# Patient Record
Sex: FEMALE | Race: WHITE | URBAN - METROPOLITAN AREA
[De-identification: names, ages, dates, MRNs, and addresses within clinical notes are randomized per-mention and may not be internally consistent; named-entity substitution may affect disease eponyms.]

---

## 2017-10-16 ENCOUNTER — OFFICE VISIT (OUTPATIENT)
Dept: FAMILY MEDICINE | Facility: CLINIC | Age: 42
End: 2017-10-16

## 2017-10-16 VITALS
TEMPERATURE: 97.9 F | DIASTOLIC BLOOD PRESSURE: 79 MMHG | RESPIRATION RATE: 18 BRPM | BODY MASS INDEX: 28.07 KG/M2 | SYSTOLIC BLOOD PRESSURE: 124 MMHG | OXYGEN SATURATION: 96 % | HEART RATE: 81 BPM | WEIGHT: 143 LBS | HEIGHT: 60 IN

## 2017-10-16 DIAGNOSIS — Z00.00 ROUTINE GENERAL MEDICAL EXAMINATION AT A HEALTH CARE FACILITY: ICD-10-CM

## 2017-10-16 DIAGNOSIS — Z00.00 ROUTINE HEALTH MAINTENANCE: Primary | ICD-10-CM

## 2017-10-16 LAB
HCT VFR BLD AUTO: 42.3 % (ref 35–47)
HEMOGLOBIN: 13.3 G/DL (ref 11.7–15.7)
MCH RBC QN AUTO: 30.9 PG (ref 26.5–35)
MCHC RBC AUTO-ENTMCNC: 31.4 G/DL (ref 32–36)
MCV RBC AUTO: 98.2 FL (ref 78–100)
PLATELET # BLD AUTO: 317 K/UL (ref 150–450)
RBC # BLD AUTO: 4.31 M/UL (ref 3.8–5.2)
WBC # BLD AUTO: 13.5 K/UL (ref 4–11)

## 2017-10-16 RX ORDER — ACETAMINOPHEN 325 MG/1
650 TABLET ORAL EVERY 6 HOURS PRN
Qty: 100 TABLET | Refills: 0 | Status: SHIPPED | OUTPATIENT
Start: 2017-10-16

## 2017-10-16 NOTE — MR AVS SNAPSHOT
After Visit Summary   10/16/2017    Catarina Guerra    MRN: [id number]           Patient Information     Date Of Birth          1975        Visit Information        Provider Department      10/16/2017 3:40 PM Christiana Mathis DO [facility]        Today's Diagnoses     Routine health maintenance    -  1      Care Instructions    Here is the plan from today's visit    1. Routine health maintenance  - M Tuberculosis by Quantiferon  - HIV Antigen Antibody Combo  - Hepatitis A Antibody IgG  - Hepatitis B Surface Antibody  - Hepatitis B core antibody  - Hepatitis B surface antigen  - Hepatitis C antibody  - TSH with free T4 reflex  - Basic Metabolic Panel ([facility])  - CBC with Plt ([facility])  - Anti Treponema [JYC1538]  - Herpes Simplex Virus 1 and 2 IgG [[id number]]  - acetaminophen (TYLENOL) 325 MG tablet; Take 2 tablets (650 mg) by mouth every 6 hours as needed for mild pain  Dispense: 100 tablet; Refill: 0      Please call or return to clinic if your symptoms don't go away.    Please make a clinic appointment for follow up with me (CHRISTIANA MATHIS) in 1 or 2 weeks for Pap Smear and test results.    Thank you for coming to [facility] today.    Christiana Mathis DO     [facility]  [phone]  [facility]    Lab Testing:  **If you had lab testing today and your results are reassuring or normal they will be mailed to you or sent through [employer] within 7 days.   **If the lab tests need quick action we will call you with the results.  The phone number we will call with results is # [phone] (home) . If this is not the best number please call our clinic and change the number.  Medication Refills:  If you need any refills please call your pharmacy and they will contact us.   If you need to  your refill at a new pharmacy, please contact the new pharmacy directly. The new pharmacy will help you get your medications  transferred faster.   Scheduling:  If you have any concerns about today's visit or wish to schedule another appointment please call our office during normal business hours [phone] (8-5:00 M-F)  If a referral was made to a [facility] and you don't get a call from central scheduling please call [phone].  If a Mammogram was ordered for you at [facility] call [phone] to schedule or change your appointment.  If you had an XRay/CT/Ultrasound/MRI ordered the number is [phone] to schedule or change your radiology appointment.   Medical Concerns:  If you have urgent medical concerns please call [phone] at any time of the day.            Follow-ups after your visit        Who to contact     Please call your clinic at [phone] to:    Ask questions about your health    Make or cancel appointments    Discuss your medicines    Learn about your test results    Speak to your doctor   If you have compliments or concerns about an experience at your clinic, or if you wish to file a complaint, please contact [facility] Patient Relations at [phone] or email us at [email or web address]         Additional Information About Your Visit        [employer] Information     [employer] gives you secure access to your electronic health record. If you see a primary care provider, you can also send messages to your care team and make appointments. If you have questions, please call your primary care clinic.  If you do not have a primary care provider, please call [phone] and they will assist you.      Neotract is an electronic gateway that provides easy, online access to your medical records. With [employer], you can request a clinic appointment, read your test results, renew a prescription or communicate with your care team.     To access your existing account, please contact your [facility] or call [phone] for  "assistance.        Care EveryWhere ID     This is your Care EveryWhere ID. This could be used by other organizations to access your [place] medical records  [id number]        Your Vitals Were     Pulse Temperature Respirations Height Pulse Oximetry BMI (Body Mass Index)    81 97.9  F (36.6  C) (Oral) 18 4' 11.65\" (151.5 cm) 96% 28.26 kg/m2       Blood Pressure from Last 3 Encounters:   10/16/17 124/79   10/27/15 108/64    Weight from Last 3 Encounters:   10/16/17 143 lb (64.9 kg)   10/27/15 127 lb (57.6 kg)              We Performed the Following     Anti Treponema [[id number]]     Basic Metabolic Panel ([facility])     CBC with Plt ([facility])     Hepatitis A Antibody IgG     Hepatitis B core antibody     Hepatitis B Surface Antibody     Hepatitis B surface antigen     Hepatitis C antibody     Herpes Simplex Virus 1 and 2 IgG [[id number]]     HIV Antigen Antibody Combo     M Tuberculosis by Quantiferon     TSH with free T4 reflex          Today's Medication Changes          These changes are accurate as of: 10/16/17  5:28 PM.  If you have any questions, ask your nurse or doctor.               Start taking these medicines.        Dose/Directions    acetaminophen 325 MG tablet   Commonly known as:  TYLENOL   Used for:  Routine health maintenance   Started by:  Christiana Mathis DO        Dose:  650 mg   Take 2 tablets (650 mg) by mouth every 6 hours as needed for mild pain   Quantity:  100 tablet   Refills:  0         Stop taking these medicines if you haven't already. Please contact your care team if you have questions.     amphetamine-dextroamphetamine 30 MG per 24 hr capsule   Commonly known as:  ADDERALL XR   Stopped by:  Christiana Mathis DO           SUBOXONE 8-2 MG per film   Generic drug:  buprenorphine HCl-naloxone HCl   Stopped by:  Christiana Mathis DO                Where to get your medicines      Some of these will need a paper prescription and others can be bought over the counter.  Ask your nurse if you have " questions.     Bring a paper prescription for each of these medications     acetaminophen 325 MG tablet                Primary Care Provider    Physician No Ref-Primary       NO REF-PRIMARY PHYSICIAN        Equal Access to Services     TAMAR PYLE : Hadii aad ku hadkailacaitlin Agustinaali, eliud cirojohanne, teetee mercedes, fredo zamorano mcyaa preston laJaydemaurilio atilio. So [facility] [phone].    ATENCIÓN: Si habla español, tiene a bhandari disposición servicios gratuitos de asistencia lingüística. Llame al [phone].    We comply with applicable federal civil rights laws and [place] laws. We do not discriminate on the basis of race, color, national origin, age, disability, sex, sexual orientation, or gender identity.            Thank you!     Thank you for choosing [facility]  for your care. Our goal is always to provide you with excellent care. Hearing back from our patients is one way we can continue to improve our services. Please take a few minutes to complete the written survey that you may receive in the mail after your visit with us. Thank you!             Your Updated Medication List - Protect others around you: Learn how to safely use, store and throw away your medicines at www.disposemymeds.org.          This list is accurate as of: 10/16/17  5:28 PM.  Always use your most recent med list.                   Brand Name Dispense Instructions for use Diagnosis    acetaminophen 325 MG tablet    TYLENOL    100 tablet    Take 2 tablets (650 mg) by mouth every 6 hours as needed for mild pain    Routine health maintenance       DULoxetine 60 MG EC capsule    CYMBALTA     Take by mouth daily

## 2017-10-16 NOTE — PATIENT INSTRUCTIONS
Here is the plan from today's visit    1. Routine health maintenance  - M Tuberculosis by Quantiferon  - HIV Antigen Antibody Combo  - Hepatitis A Antibody IgG  - Hepatitis B Surface Antibody  - Hepatitis B core antibody  - Hepatitis B surface antigen  - Hepatitis C antibody  - TSH with free T4 reflex  - Basic Metabolic Panel ([facility])  - CBC with Plt ([facility])  - Anti Treponema [[id number]]  - Herpes Simplex Virus 1 and 2 IgG [[id number]]  - acetaminophen (TYLENOL) 325 MG tablet; Take 2 tablets (650 mg) by mouth every 6 hours as needed for mild pain  Dispense: 100 tablet; Refill: 0      Please call or return to clinic if your symptoms don't go away.    Please make a clinic appointment for follow up with me (CHRISTIANA MATHIS) in 1 or 2 weeks for Pap Smear and test results.    Thank you for coming to [facility] today.    Christiana Mathis, DO     [facility]  [phone]  [facility]    Lab Testing:  **If you had lab testing today and your results are reassuring or normal they will be mailed to you or sent through [employer] within 7 days.   **If the lab tests need quick action we will call you with the results.  The phone number we will call with results is # [phone] (home) . If this is not the best number please call our clinic and change the number.  Medication Refills:  If you need any refills please call your pharmacy and they will contact us.   If you need to  your refill at a new pharmacy, please contact the new pharmacy directly. The new pharmacy will help you get your medications transferred faster.   Scheduling:  If you have any concerns about today's visit or wish to schedule another appointment please call our office during normal business hours [phone] (8-5:00 M-F)  If a referral was made to a [facility] and you don't get a call from central scheduling please call [phone].  If a Mammogram was ordered for you at  [facility] call [phone] to schedule or change your appointment.  If you had an XRay/CT/Ultrasound/MRI ordered the number is [phone] to schedule or change your radiology appointment.   Medical Concerns:  If you have urgent medical concerns please call [phone] at any time of the day.    Preventive Health Recommendations  Female Ages 40 to 49    Yearly exam:     See your health care provider every year in order to  1. Review health changes.   2. Discuss preventive care.    3. Review your medicines if your doctor prescribed any.      Get a Pap test every three years (unless you have an abnormal result and your provider advises testing more often).      If you get Pap tests with HPV test, you only need to test every 5 years, unless you have an abnormal result. You do not need a Pap test if your uterus was removed (hysterectomy) and you have not had cancer.      You should be tested each year for STDs (sexually transmitted diseases), if you're at risk.       Ask your doctor if you should have a mammogram.      Have a colonoscopy (test for colon cancer) if someone in your family has had colon cancer or polyps before age 50.       Have a cholesterol test every 5 years.       Have a diabetes test (fasting glucose) after age 45. If you are at risk for diabetes, you should have this test every 3 years.    Shots: Get a flu shot each year. Get a tetanus shot every 10 years.     Nutrition:     Eat at least 5 servings of fruits and vegetables each day.    Eat whole-grain bread, whole-wheat pasta and brown rice instead of white grains and rice.    Talk to your provider about Calcium and Vitamin D.     Lifestyle    Exercise at least 150 minutes a week (an average of 30 minutes a day, 5 days a week). This will help you control your weight and prevent disease.    Limit alcohol to one drink per day.    No smoking.     Wear sunscreen to prevent skin cancer.    See your dentist every six months for an exam and  cleaning.

## 2017-10-17 LAB
BUN SERPL-MCNC: 18.6 MG/DL (ref 7–19)
CALCIUM SERPL-MCNC: 9.6 MG/DL (ref 8.5–10.1)
CHLORIDE SERPLBLD-SCNC: 104.4 MMOL/L (ref 98–110)
CO2 SERPL-SCNC: 24.5 MMOL/L (ref 20–32)
CREAT SERPL-MCNC: 0.8 MG/DL (ref 0.5–1)
GFR SERPL CREATININE-BSD FRML MDRD: 82.8 ML/MIN/1.7 M2
GLUCOSE SERPL-MCNC: 105 MG'DL (ref 70–99)
HAV IGG SER QL IA: REACTIVE
HBV CORE AB SERPL QL IA: NONREACTIVE
HBV SURFACE AB SERPL IA-ACNC: 0.01 M[IU]/ML
HBV SURFACE AG SERPL QL IA: NONREACTIVE
HCV AB SERPL QL IA: NONREACTIVE
HIV 1+2 AB+HIV1 P24 AG SERPL QL IA: NONREACTIVE
POTASSIUM SERPL-SCNC: 3.9 MMOL/DL (ref 3.3–4.5)
SODIUM SERPL-SCNC: 134.8 MMOL/L (ref 132.6–141.4)
TSH SERPL DL<=0.005 MIU/L-ACNC: 1.01 MU/L (ref 0.4–4)

## 2017-10-18 LAB
HSV1 IGG SERPL QL IA: >8 AI (ref 0–0.8)
HSV2 IGG SERPL QL IA: <0.2 AI (ref 0–0.8)
M TB TUBERC IFN-G BLD QL: NEGATIVE
M TB TUBERC IFN-G/MITOGEN IGNF BLD: 0.02 IU/ML
T PALLIDUM IGG+IGM SER QL: NEGATIVE

## 2017-10-20 LAB — HAV IGM SERPL QL IA: NONREACTIVE

## 2017-10-24 ENCOUNTER — OFFICE VISIT (OUTPATIENT)
Dept: FAMILY MEDICINE | Facility: CLINIC | Age: 42
End: 2017-10-24

## 2017-10-24 VITALS
DIASTOLIC BLOOD PRESSURE: 75 MMHG | WEIGHT: 148 LBS | RESPIRATION RATE: 18 BRPM | BODY MASS INDEX: 29.25 KG/M2 | OXYGEN SATURATION: 96 % | HEART RATE: 89 BPM | TEMPERATURE: 98.2 F | SYSTOLIC BLOOD PRESSURE: 110 MMHG

## 2017-10-24 DIAGNOSIS — M25.50 ARTHRALGIA, UNSPECIFIED JOINT: ICD-10-CM

## 2017-10-24 DIAGNOSIS — R35.89 POLYURIA: ICD-10-CM

## 2017-10-24 DIAGNOSIS — K59.09 OTHER CONSTIPATION: Primary | ICD-10-CM

## 2017-10-24 DIAGNOSIS — Z12.4 PAP SMEAR FOR CERVICAL CANCER SCREENING: ICD-10-CM

## 2017-10-24 LAB
BILIRUBIN UR: NEGATIVE
BLOOD UR: NEGATIVE
GLUCOSE URINE: NEGATIVE
KETONES UR QL: NEGATIVE
LEUKOCYTE ESTERASE UR: NEGATIVE
NITRITE UR QL STRIP: NEGATIVE
PH UR STRIP: 7 [PH] (ref 5–7)
PROTEIN UR: NEGATIVE
SP GR UR STRIP: 1.01
UROBILINOGEN UR STRIP-ACNC: NORMAL

## 2017-10-24 RX ORDER — NAPROXEN 500 MG/1
500 TABLET ORAL 2 TIMES DAILY PRN
Qty: 60 TABLET | Refills: 1 | Status: SHIPPED | OUTPATIENT
Start: 2017-10-24 | End: 2017-10-24

## 2017-10-24 RX ORDER — POLYETHYLENE GLYCOL 3350 17 G/17G
1 POWDER, FOR SOLUTION ORAL DAILY
Qty: 510 G | Refills: 1 | Status: SHIPPED | OUTPATIENT
Start: 2017-10-24

## 2017-10-24 ASSESSMENT — ENCOUNTER SYMPTOMS
NAUSEA: 0
NERVOUS/ANXIOUS: 1
ABDOMINAL PAIN: 0
CONSTIPATION: 1
DIARRHEA: 0
HEMATURIA: 0
VOMITING: 0
COUGH: 0
APPETITE CHANGE: 1
FREQUENCY: 1
DYSURIA: 0
FEVER: 0
SHORTNESS OF BREATH: 0
CHILLS: 0

## 2017-10-24 NOTE — MR AVS SNAPSHOT
After Visit Summary   10/24/2017    Catarina Guerra    MRN: [id number]           Patient Information     Date Of Birth          1975        Visit Information        Provider Department      10/24/2017 1:20 PM Anny Mayen MD [facility]        Today's Diagnoses     Other constipation    -  1    Arthralgia, unspecified joint        Polyuria        Pap smear for cervical cancer screening          Care Instructions    Here is the plan from today's visit    1. Other constipation  Use Miralax daily or as needed to help with constipation.    - polyethylene glycol (MIRALAX) powder; Take 17 g (1 capful) by mouth daily  Dispense: 510 g; Refill: 1    2. Arthralgia, unspecified joint  Use Naproxen once or twice daily as needed for joint pain  - naproxen (NAPROSYN) 500 MG tablet; Take 1 tablet (500 mg) by mouth 2 times daily as needed for moderate pain  Dispense: 60 tablet; Refill: 1    3. Polyuria  We will check your urine for infection  - Urinalysis, Micro If (UA) ([facility])    4. Pap smear for cervical cancer screening  I performed a pap smear, although I am not completely sure if you have a cervix.    - Pap imaged thin layer screen with HPV - recommended age 30 - 65 years (select HPV order below)  - HPV High Risk Types DNA Cervical    Please call or return to clinic if your symptoms don't go away.    Follow up plan  Please make a clinic appointment for follow up with me (ANNY MAYEN) in 2-4 weeks for follow up.   Thank you for coming to [facility] today.  Lab Testing:  **If you had lab testing today and your results are reassuring or normal they will be mailed to you or sent through Getable within 7 days.   **If the lab tests need quick action we will call you with the results.  The phone number we will call with results is # [phone] (home) . If this is not the best number please call our clinic and change the number.  Medication Refills:  If you need any  refills please call your pharmacy and they will contact us.   If you need to  your refill at a new pharmacy, please contact the new pharmacy directly. The new pharmacy will help you get your medications transferred faster.   Scheduling:  If you have any concerns about today's visit or wish to schedule another appointment please call our office during normal business hours [phone] (8-5:00 M-F)  If a referral was made to a [facility] and you don't get a call from central scheduling please call [phone].  If a Mammogram was ordered for you at [facility] call [phone] to schedule or change your appointment.  If you had an XRay/CT/Ultrasound/MRI ordered the number is [phone] to schedule or change your radiology appointment.   Medical Concerns:  If you have urgent medical concerns please call [phone] at any time of the day.            Follow-ups after your visit        Who to contact     Please call your clinic at [phone] to:    Ask questions about your health    Make or cancel appointments    Discuss your medicines    Learn about your test results    Speak to your doctor   If you have compliments or concerns about an experience at your clinic, or if you wish to file a complaint, please contact [facility] Patient Relations at [phone] or email us at [email or web address]         Additional Information About Your Visit        MyChart Information     [facility] gives you secure access to your electronic health record. If you see a primary care provider, you can also send messages to your care team and make appointments. If you have questions, please call your primary care clinic.  If you do not have a primary care provider, please call [phone] and they will assist you.      [facility] is an electronic gateway that provides easy, online access to your medical records. With [facility], you can request a clinic appointment,  read your test results, renew a prescription or communicate with your care team.     To access your existing account, please contact your [facility] or call [phone] for assistance.        Care EveryWhere ID     This is your Care EveryWhere ID. This could be used by other organizations to access your [place] medical records  [id number]        Your Vitals Were     Pulse Temperature Respirations Pulse Oximetry BMI (Body Mass Index)       89 98.2  F (36.8  C) (Oral) 18 96% 29.25 kg/m2        Blood Pressure from Last 3 Encounters:   10/24/17 110/75   10/16/17 124/79   10/27/15 108/64    Weight from Last 3 Encounters:   10/24/17 148 lb (67.1 kg)   10/16/17 143 lb (64.9 kg)   10/27/15 127 lb (57.6 kg)              We Performed the Following     HPV High Risk Types DNA Cervical     Pap imaged thin layer screen with HPV - recommended age 30 - 65 years (select HPV order below)     Urinalysis, Micro If (UA) ([facility])          Today's Medication Changes          These changes are accurate as of: 10/24/17  2:01 PM.  If you have any questions, ask your nurse or doctor.               Start taking these medicines.        Dose/Directions    naproxen 500 MG tablet   Commonly known as:  NAPROSYN   Used for:  Arthralgia, unspecified joint   Started by:  Anny Bethea MD        Dose:  500 mg   Take 1 tablet (500 mg) by mouth 2 times daily as needed for moderate pain   Quantity:  60 tablet   Refills:  1       polyethylene glycol powder   Commonly known as:  MIRALAX   Used for:  Other constipation   Started by:  Anny Bethea MD        Dose:  1 capful   Take 17 g (1 capful) by mouth daily   Quantity:  510 g   Refills:  1            Where to get your medicines      These medications were [place] [facility] [place] [address]  [address], [place] [address]     Phone:  [phone]     naproxen 500 MG tablet    polyethylene glycol  powder                Primary Care Provider    Physician No Ref-Primary       NO REF-PRIMARY PHYSICIAN        Equal Access to Services     TAMAR PYLE : Hadii aad ku hadkailacaitlin Rorojessica, washirada fernandez, doroteoryan ortizvenufredo trivedi. So [facility] [phone].    ATENCIÓN: Si habla español, tiene a bhandari disposición servicios gratuitos de asistencia lingüística. Llame al [phone].    We comply with applicable federal civil rights laws and [place] laws. We do not discriminate on the basis of race, color, national origin, age, disability, sex, sexual orientation, or gender identity.            Thank you!     Thank you for choosing [facility]  for your care. Our goal is always to provide you with excellent care. Hearing back from our patients is one way we can continue to improve our services. Please take a few minutes to complete the written survey that you may receive in the mail after your visit with us. Thank you!             Your Updated Medication List - Protect others around you: Learn how to safely use, store and throw away your medicines at www.disposemymeds.org.          This list is accurate as of: 10/24/17  2:01 PM.  Always use your most recent med list.                   Brand Name Dispense Instructions for use Diagnosis    acetaminophen 325 MG tablet    TYLENOL    100 tablet    Take 2 tablets (650 mg) by mouth every 6 hours as needed for mild pain    Routine health maintenance       DULoxetine 60 MG EC capsule    CYMBALTA     Take by mouth daily        naproxen 500 MG tablet    NAPROSYN    60 tablet    Take 1 tablet (500 mg) by mouth 2 times daily as needed for moderate pain    Arthralgia, unspecified joint       polyethylene glycol powder    MIRALAX    510 g    Take 17 g (1 capful) by mouth daily    Other constipation

## 2017-10-24 NOTE — PROGRESS NOTES
Preceptor Attestation:   Patient seen and discussed with the resident. Assessment and plan reviewed with resident and agreed upon.   Supervising Physician:  Anny Hickman MD  [facility]

## 2017-10-24 NOTE — LETTER
November 2, 2017      Catarina Guerra  [address] [place] [id number]        Dear Catarina,    Thank you for getting your care at [facility]. Please see below for your test results.    Resulted Orders   Urinalysis, Micro If (UA) ([facility])   Result Value Ref Range    Specific Gravity Urine 1.015 1.005 - 1.030    pH Urine 7.0 4.5 - 8.0    Leukocyte Esterase UR Negative NEGATIVE    Nitrite Urine Negative NEGATIVE    Protein UR Negative NEGATIVE    Glucose Urine Negative NEGATIVE    Ketones Urine Negative NEGATIVE    Urobilinogen mg/dL 0.2 E.U./dL 0.2 E.U./dL    Bilirubin UR Negative NEGATIVE    Blood UR Negative NEGATIVE   Pap imaged thin layer screen with HPV - recommended age 30 - 65 years (select HPV order below)   Result Value Ref Range    PAP NIL     Copath Report         Acc#: [id number]   Signed: 10/26/2017 10:28   MR#: [id number]    SPECIMEN/STAIN PROCESS:  Pap imaged thin layer prep screening (Surepath, F[employer]Point with guided  screening)       Pap-Cyto x 1, HPV ordered x 1    SOURCE: Cervical, endocervical  ----------------------------------------------------------------   Pap imaged thin layer prep screening (Surepath, FocalPoint with guided  screening)  SPECIMEN ADEQUACY:  Satisfactory for evaluation.  -Transformation zone component absent.    CYTOLOGIC INTERPRETATION:    Negative for intraepithelial lesion or malignancy    Electronically signed by:  TERRY Mishra  (ASCP)    CLINICAL HISTORY:    Partial Hysterectomy,    Papanicolaou Test Limitations:  Cervical cytology is a screening test  with limited sensitivity; regular screening is critical for cancer  prevention; Pap tests are primarily effective for the  diagnosis/prevention of squamous cell carcinoma, not adenocarcinomas or  other cancers.  TESTING LAB LOCATION:  [facility], [address] [place] [id number], [phone]  Processed and screened at [facility]  [facility]     HPV High Risk Types DNA Cervical   Result Value Ref Range    HPV 16 DNA Negative NEG^Negative    HPV 18 DNA Negative NEG^Negative    Other HR HPV Negative NEG^Negative    Final Diagnosis This patient's sample is negative for HPV DNA.       Comment:      (Note)  METHODOLOGY:  The Roche mahad 4800 system uses automated extraction,   simultaneous amplification of HPV (L1 region) and beta-globin,    followed by  real time detection of fluorescent labeled HPV and beta   globin using specific oligonucleotide probes . The test specifically   identifies types HPV 16 DNA and HPV 18 DNA while concurrently   detecting the rest of the high risk types (31, 33, 35, 39, 45, 51,   52, 56, 58, 59, 66 or 68).  COMMENTS:  This test is not intended for use as a screening device   for women under age 30 with normal cervical cytology.  Results should   be correlated with cytologic and histologic findings. Close clinical   followup is recommended.  This test was developed and its performance characteristics   determined by the [facility], [facility]   [facility]. It has not been cleared or approved by the   FDA. The laboratory is regulated under CLIA as qualified to perform   high-complexity testing. This test is used   for clinical purposes. It   should not be regarded as investigational or for research.      Specimen Description Cervical Cells       Comment:      [id number]       If you have any concerns about these results please call and leave a message for me or send a MyChart message to the clinic.    Sincerely,    Anny Bethea MD

## 2017-10-24 NOTE — TELEPHONE ENCOUNTER
Request for medication refill:  Naproxen 500mg     Date of last visit at clinic: 10/24/2017    Please complete refill if appropriate and CLOSE ENCOUNTER.    Closing the encounter signifies the refill is complete.    If refill has been denied, please complete the smart phrase .smirefuse and route it to the [facility] RN TRIAGE pool to inform the patient and the pharmacy.    Elizabeth Merida, CMA

## 2017-10-24 NOTE — PATIENT INSTRUCTIONS
Here is the plan from today's visit    1. Other constipation  Use Miralax daily or as needed to help with constipation.    - polyethylene glycol (MIRALAX) powder; Take 17 g (1 capful) by mouth daily  Dispense: 510 g; Refill: 1    2. Arthralgia, unspecified joint  Use Naproxen once or twice daily as needed for joint pain  - naproxen (NAPROSYN) 500 MG tablet; Take 1 tablet (500 mg) by mouth 2 times daily as needed for moderate pain  Dispense: 60 tablet; Refill: 1    3. Polyuria  We will check your urine for infection  - Urinalysis, Micro If (UA) ([facility])    4. Pap smear for cervical cancer screening  I performed a pap smear, although I am not completely sure if you have a cervix.    - Pap imaged thin layer screen with HPV - recommended age 30 - 65 years (select HPV order below)  - HPV High Risk Types DNA Cervical    Please call or return to clinic if your symptoms don't go away.    Follow up plan  Please make a clinic appointment for follow up with me (SHERRY MAYEN) in 2-4 weeks for follow up.   Thank you for coming to [facility] today.  Lab Testing:  **If you had lab testing today and your results are reassuring or normal they will be mailed to you or sent through [employer] within 7 days.   **If the lab tests need quick action we will call you with the results.  The phone number we will call with results is # [phone] (home) . If this is not the best number please call our clinic and change the number.  Medication Refills:  If you need any refills please call your pharmacy and they will contact us.   If you need to  your refill at a new pharmacy, please contact the new pharmacy directly. The new pharmacy will help you get your medications transferred faster.   Scheduling:  If you have any concerns about today's visit or wish to schedule another appointment please call our office during normal business hours [phone] (8-5:00 M-F)  If a referral was made to a [facility] and you don't get a call from central scheduling please call [phone].  If a Mammogram was ordered for you at [facility] call [phone] to schedule or change your appointment.  If you had an XRay/CT/Ultrasound/MRI ordered the number is [phone] to schedule or change your radiology appointment.   Medical Concerns:  If you have urgent medical concerns please call [phone] at any time of the day.

## 2017-10-25 ENCOUNTER — TELEPHONE (OUTPATIENT)
Dept: FAMILY MEDICINE | Facility: CLINIC | Age: 42
End: 2017-10-25

## 2017-10-25 NOTE — TELEPHONE ENCOUNTER
[facility] phone call message- medication clarification/question:    Full Medication Name: Mirtazapine   Dose: 15 MG    Question: Rolanda called from [facility].  States patient has Mirtazapine medication.  Would like to know if this medication can be changed from AM to bedtime.  Please call [phone] or fax to [phone].    Pharmacy confirmed as [facility] [place] [id number]: Yes    OK to leave a message on voice mail? Yes    Primary language: English      needed? No    Call taken on October 25, 2017 at 1:11 PM by Rubina Cole

## 2017-10-26 LAB
COPATH REPORT: NORMAL
PAP: NORMAL

## 2017-10-26 NOTE — TELEPHONE ENCOUNTER
Returned call to Rolanda, but she was out of the office.  Regarding the Mirtazapine for this patient, it is not something I was aware of or that is on her medication list.  I would suggest that they call the original physician who prescribed the medication to have the dosage and/or time adjusted.    Thanks,   Anny Bethea M.D.  PGY-3, Family Medicine

## 2017-10-27 LAB
FINAL DIAGNOSIS: NORMAL
HPV HR 12 DNA CVX QL NAA+PROBE: NEGATIVE
HPV16 DNA SPEC QL NAA+PROBE: NEGATIVE
HPV18 DNA SPEC QL NAA+PROBE: NEGATIVE
SPECIMEN DESCRIPTION: NORMAL

## 2017-10-27 RX ORDER — NAPROXEN 500 MG/1
500 TABLET ORAL 2 TIMES DAILY PRN
Qty: 60 TABLET | Refills: 1 | Status: SHIPPED | OUTPATIENT
Start: 2017-10-27

## 2017-10-31 NOTE — TELEPHONE ENCOUNTER
RN called Rolanda. Rolanda stated she heard from Dr. Bethea. No further action requried    Mary Ellen Green RN

## 2017-11-09 ASSESSMENT — ENCOUNTER SYMPTOMS
SPEECH DIFFICULTY: 0
EYES NEGATIVE: 1
WEAKNESS: 0
SEIZURES: 0
MYALGIAS: 0
COUGH: 0
CONFUSION: 0
HEADACHES: 0
DIFFICULTY URINATING: 0
ACTIVITY CHANGE: 0
DYSURIA: 0
CONSTIPATION: 0
SHORTNESS OF BREATH: 0
PALPITATIONS: 0
WHEEZING: 0
VOMITING: 0
APPETITE CHANGE: 0
FREQUENCY: 1
ENDOCRINE NEGATIVE: 1
CHEST TIGHTNESS: 0
NAUSEA: 0
JOINT SWELLING: 0
NERVOUS/ANXIOUS: 1
FEVER: 0
FLANK PAIN: 0
SLEEP DISTURBANCE: 1
FACIAL ASYMMETRY: 0
AGITATION: 0
DIARRHEA: 0
ARTHRALGIAS: 0
HEMATURIA: 0
ABDOMINAL PAIN: 0
DYSPHORIC MOOD: 0
BACK PAIN: 1

## 2017-11-10 NOTE — PROGRESS NOTES
HPI:       Catarina Guerra is a 42 year old who presents for a general Physical. She also reports urinary urgency symptoms that have been present for the past year. She has a Hx of drug and alcohol addiction in remission, and she is currently a resident at [facility].       Concerns today:   Concern: Urinary Incontinence   Description of the problem : present for year+, but getting worse during prior 6 months that she has been having hot flashes.       Adherence and Exercise  Medication side effects: no  How often is a medication missed? Never, but recent medication changes are causing anxiety. Will not be getting Adderall for ADHD or Suboxone for addiction at current facility.  Exercise: No regular exercise     Patient Active Problem List   Diagnosis     Opioid type dependence (H)     Mood disorder (H)     Abscess        Family History     Age of Onset     CANCER       skin cancer     42 year old female for annual routine checkup.  Current Outpatient Prescriptions   Medication Sig Dispense Refill     acetaminophen (TYLENOL) 325 MG tablet Take 2 tablets (650 mg) by mouth every 6 hours as needed for mild pain 100 tablet 0     DULoxetine (CYMBALTA) 60 MG capsule Take by mouth daily       naproxen (NAPROSYN) 500 MG tablet Take 1 tablet (500 mg) by mouth 2 times daily as needed for moderate pain 60 tablet 1     polyethylene glycol (MIRALAX) powder Take 17 g (1 capful) by mouth daily 510 g 1     Allergies: Adhesive tape   No LMP recorded. Patient has had a hysterectomy. Ovaries were spared at the time of her hysterectomy, but she states that she has been having hot flashes routinely for the past few months.    Problem, Medication and Allergy Lists were reviewed and are current.     Patient Active Problem List    Diagnosis Date Noted     Abscess 09/07/2015     Priority: Medium     Opioid type dependence (H) 11/05/2013     Priority: Medium     Mood disorder (H) 11/05/2013     Priority: Medium   ,      Current Outpatient Prescriptions   Medication Sig Dispense Refill     acetaminophen (TYLENOL) 325 MG tablet Take 2 tablets (650 mg) by mouth every 6 hours as needed for mild pain 100 tablet 0     DULoxetine (CYMBALTA) 60 MG capsule Take by mouth daily       naproxen (NAPROSYN) 500 MG tablet Take 1 tablet (500 mg) by mouth 2 times daily as needed for moderate pain 60 tablet 1     polyethylene glycol (MIRALAX) powder Take 17 g (1 capful) by mouth daily 510 g 1   ,     Allergies   Allergen Reactions     Adhesive Tape Rash     Patient is a new patient to this clinic and so  I reviewed/updated the Past Medical History, the Family History and the Social History.          Review of Systems:   Review of Systems   Constitutional: Negative for activity change, appetite change and fever.   HENT: Negative.    Eyes: Negative.    Respiratory: Negative for cough, chest tightness, shortness of breath and wheezing.    Cardiovascular: Negative for chest pain, palpitations and leg swelling.   Gastrointestinal: Negative for abdominal pain, constipation, diarrhea, nausea and vomiting.   Endocrine: Negative.    Genitourinary: Positive for frequency and urgency. Negative for decreased urine volume, difficulty urinating, dysuria, enuresis, flank pain, hematuria, vaginal bleeding, vaginal discharge and vaginal pain.   Musculoskeletal: Positive for back pain (chronic). Negative for arthralgias, gait problem, joint swelling and myalgias.   Skin: Negative.    Neurological: Negative for seizures, syncope, facial asymmetry, speech difficulty, weakness and headaches.   Psychiatric/Behavioral: Positive for sleep disturbance (since withdrawing from substance use). Negative for agitation, confusion and dysphoric mood. The patient is nervous/anxious (nervous about medication changes).    All other systems reviewed and are negative.   Breasts: no pain or new or enlarging lumps on self exam.          Physical Exam:   Body mass index is 28.26  "kg/(m^2).  Vitals were reviewed and were normal  Blood pressure 124/79, pulse 81, temperature 97.9  F (36.6  C), temperature source Oral, resp. rate 18, height 4' 11.65\" (151.5 cm), weight 143 lb (64.9 kg), SpO2 96 %, not currently breastfeeding.     Physical Exam   Constitutional: She is oriented to person, place, and time. She appears well-developed and well-nourished. No distress.   HENT:   Head: Normocephalic and atraumatic.   Right Ear: External ear normal.   Left Ear: External ear normal.   Nose: Nose normal.   Mouth/Throat: Oropharynx is clear and moist.   Eyes: Conjunctivae and EOM are normal. Pupils are equal, round, and reactive to light.   Neck: Normal range of motion. Neck supple. No thyromegaly present.   Cardiovascular: Normal rate, regular rhythm, normal heart sounds and intact distal pulses.    No murmur heard.  Pulmonary/Chest: Effort normal and breath sounds normal. No respiratory distress.   Abdominal: Soft. Bowel sounds are normal. She exhibits no distension and no mass. There is no tenderness. There is no rebound and no guarding.   Musculoskeletal: Normal range of motion. She exhibits no edema, tenderness or deformity.   Lymphadenopathy:     She has no cervical adenopathy.   Neurological: She is alert and oriented to person, place, and time.   Skin: Skin is warm and dry. She is not diaphoretic.   Psychiatric: She has a normal mood and affect. Thought content normal.   Vitals reviewed.      Results:   Labs Ordered. No new results in prior 24 hours.   Assessment and Plan   ASSESSMENT: well woman. Incontinence symptoms are reported to correlate with likely menopause.     PLAN:   Will send a urine sample for testing to rule out UTI and other bladder dysfunction. Will consider/discuss possible vaginal estrogen supplementation to improve urinary continence at a future visit.    1. Routine health maintenance  - M Tuberculosis by Quantiferon  - HIV Antigen Antibody Combo  - Hepatitis A Antibody IgG  - " Hepatitis B Surface Antibody  - Hepatitis B core antibody  - Hepatitis B surface antigen  - Hepatitis C antibody  - TSH with free T4 reflex  - Basic Metabolic Panel ([facility])  - CBC with Plt ([facility])  - Anti Treponema [[id number]]  - Herpes Simplex Virus 1 and 2 IgG [ECX2297]  - acetaminophen (TYLENOL) 325 MG tablet; Take 2 tablets (650 mg) by mouth every 6 hours as needed for mild pain  Dispense: 100 tablet; Refill: 0    -- Lifestyle Discussion    Attempt to begin exercise at least 150 minutes a week (an average of 30 minutes a day, 5 days a week). This will help you control your weight and prevent disease.    No smoking.     Wear sunscreen to prevent skin cancer.    See your dentist every six months for an exam and cleaning.      Follow up with me (CHRISTIANA MATHIS) in 1 or 2 weeks for problem-focused visit and test results.    Medications Discontinued During This Encounter   Medication Reason     amphetamine-dextroamphetamine (ADDERALL XR) 30 MG per capsule      buprenorphine HCl-naloxone HCl (SUBOXONE) 8-2 MG film      Options for treatment and follow-up care were reviewed with the patient. Catarina Guerra engaged in the decision making process and verbalized understanding of the options discussed and agreed with the final plan.    Christiana Mathis DO

## 2017-11-10 NOTE — PROGRESS NOTES
Preceptor Attestation:   Patient seen and discussed with the resident. Assessment and plan reviewed with resident and agreed upon.   Supervising Physician:  [facility]

## 2017-11-13 ENCOUNTER — OFFICE VISIT (OUTPATIENT)
Dept: FAMILY MEDICINE | Facility: CLINIC | Age: 42
End: 2017-11-13

## 2017-11-13 VITALS
OXYGEN SATURATION: 96 % | SYSTOLIC BLOOD PRESSURE: 128 MMHG | TEMPERATURE: 98 F | DIASTOLIC BLOOD PRESSURE: 84 MMHG | RESPIRATION RATE: 18 BRPM | BODY MASS INDEX: 29.72 KG/M2 | WEIGHT: 150.4 LBS | HEART RATE: 98 BPM

## 2017-11-13 DIAGNOSIS — R39.15 URINARY URGENCY: ICD-10-CM

## 2017-11-13 DIAGNOSIS — K21.9 GASTROESOPHAGEAL REFLUX DISEASE WITHOUT ESOPHAGITIS: ICD-10-CM

## 2017-11-13 DIAGNOSIS — N63.0 LUMP OR MASS IN BREAST: Primary | ICD-10-CM

## 2017-11-13 DIAGNOSIS — Z23 ENCOUNTER FOR IMMUNIZATION: ICD-10-CM

## 2017-11-13 ASSESSMENT — ENCOUNTER SYMPTOMS
CHILLS: 0
FEVER: 0
HEMATURIA: 0
SHORTNESS OF BREATH: 0
FREQUENCY: 1
PALPITATIONS: 0
COUGH: 0
NERVOUS/ANXIOUS: 0

## 2017-11-13 NOTE — PATIENT INSTRUCTIONS
Here is the plan from today's visit    1. Lump or mass in breast  - Screening Mammogram Digital Bilateral; Future    2. Urinary urgency  - UROLOGY ADULT REFERRAL - INTERNAL    3. Gastroesophageal reflux disease without esophagitis  - ranitidine (ZANTAC) 150 MG tablet; Take 1 tablet (150 mg) by mouth 2 times daily  Dispense: 60 tablet; Refill: 3    Please call or return to clinic if your symptoms don't go away.    Follow up plan  Please make a clinic appointment for follow up with me (SHERRY MAYEN) in 1-2  months for follow up.    Thank you for coming to [facility] today.  Lab Testing:  **If you had lab testing today and your results are reassuring or normal they will be mailed to you or sent through [employer] within 7 days.   **If the lab tests need quick action we will call you with the results.  The phone number we will call with results is # [phone] (home) . If this is not the best number please call our clinic and change the number.  Medication Refills:  If you need any refills please call your pharmacy and they will contact us.   If you need to  your refill at a new pharmacy, please contact the new pharmacy directly. The new pharmacy will help you get your medications transferred faster.   Scheduling:  If you have any concerns about today's visit or wish to schedule another appointment please call our office during normal business hours [phone] (8-5:00 M-F)  If a referral was made to a [facility] and you don't get a call from central scheduling please call [phone].  If a Mammogram was ordered for you at [facility] call [phone] to schedule or change your appointment.  If you had an XRay/CT/Ultrasound/MRI ordered the number is [phone] to schedule or change your radiology appointment.   Medical Concerns:  If you have urgent medical concerns please call [phone] at any time of the day.

## 2017-11-13 NOTE — PROGRESS NOTES
"      HPI:       Catarina Guerra is a 42 year old who presents for the following  Patient presents with:  *-*INCOMING RECORDS*-*: pt submitted ABDI x1 mth ago  Breast Pain: L breast causing pain. x2 days ago  [facility]: [facility]    Patient is a 42 year old female with a past medical history of opiod dependence, [facility] that presents requesting a referral for Urology and also has a breast lump.       Requesting Urology referral    Last 4-5 years, patient has had problems with urinary urgency and frequency.  Patient has incontinence with laughing, sneezing and walking fast.  Patient states large amounts of urine come out and she is dependent on wearing Depends/bladder pads during the day and at night.  Patient urinates every 1-2 hours and gets up at night 3-4 times to urinate.  No dysuria.  Patient has had a partial hysterectomy (age 21), no other surgeries.  She does not know if her cervix was removed or not although later she stated the hysterectomy was performed for \"cervical cancer\".   Patient has had 3 vaginal delivers (last delivery 1995).  Patient states she is on multiple medications (naprosyn, ranitidine, mirtazipine, and prazosin and stratera, and duloxetine, hydroxyzine).       Patient has been evaluated by a Urologist in [facility] prior to going to [facility] and was told she has \"mixed incontinence\", possibly a combination of stress and urge incontinence.  She was then referred to another provider for surgical evaluation.  She never received the surgical evaluation.  She has tried oxybutynin and another medication (not sure what it was) but they did not help symptoms.     At patient's last visit, I attempted to obtain records from prior Urologist, but all that was sent were the results of a postvoid residual.        Requesting Breast exam  Patient noticed a lump in left breast approximately 4 days prior.  She has never breast lumps in the past.  Lump is " painful, no nipple discharge or skin changes.  All aunts (4 sisters) on Father's side of family had breast cancer.  According to NCI Breast Cancer Tool, patient's 5 year risk of breast cancer is 0.6% (vs 0.9% for general population).       Requesting Refill for Ranitidine  Patient has GERD, requesting to have Ranitidine increased to 150 mg twice daily.      Problem, Medication and Allergy Lists were reviewed and are current.  Patient is an established patient of this clinic.         Review of Systems:   Review of Systems   Constitutional: Negative for chills and fever.   Respiratory: Negative for cough and shortness of breath.    Cardiovascular: Negative for chest pain and palpitations.   Genitourinary: Positive for frequency and urgency. Negative for hematuria.   Musculoskeletal:        Left breast lump   Psychiatric/Behavioral: The patient is not nervous/anxious.              Physical Exam:   Patient Vitals for the past 24 hrs:   BP Temp Temp src Pulse Resp SpO2 Weight   11/13/17 1318 128/84 98  F (36.7  C) Oral 98 18 96 % 150 lb 6.4 oz (68.2 kg)     Body mass index is 29.72 kg/(m^2).  Vitals were reviewed and were normal     Physical Exam   Constitutional: She is oriented to person, place, and time. She appears well-developed and well-nourished.   HENT:   Head: Normocephalic.   Neck: Normal range of motion. Neck supple.   Cardiovascular: Normal rate.    Pulmonary/Chest: Effort normal.   1x2 cm elongated lump, mobile, at 1:00, 2 cm from areola (consistent with firm breast tissue)   Musculoskeletal: Normal range of motion.   Neurological: She is alert and oriented to person, place, and time.   Skin: No rash noted. No erythema. No pallor.   Psychiatric: She has a normal mood and affect. Her behavior is normal. Judgment and thought content normal.   Vitals reviewed.      Results:     No labs ordered.  Assessment and Plan   Catarina was seen today for *-*incoming records*-*, breast pain and forms.  At patient's last visit,  she signed a ABDI so I could receive her records from her prior urologist.  The only record they sent was a post void residual scan- no labs, records, notes.  Based on patient's history of stress and urge incontinence and her reports that she was referred to a surgeon, I will refer her to a urologist.  She has tried medical management before, but has been unsuccessful.      Patient also noted a breast lump of left breast.  The lump is mobile, 1x2 cm (feels like fibrous breast tissue), but will send patient for mammogram based on her concern.  Also calculated patient's risk of breast cancer (based on having 3 paternal aunts with breast cancer)- her risk is actually lower than the general population, so she does not need genetic counseling.    Patient advised to RETURN TO CLINIC after she sees Urology and has mammogram performed.     Diagnoses and all orders for this visit:    Lump or mass in breast  -     Screening Mammogram Digital Bilateral; Future    Urinary urgency  -     UROLOGY ADULT REFERRAL - INTERNAL    Gastroesophageal reflux disease without esophagitis  -     ranitidine (ZANTAC) 150 MG tablet; Take 1 tablet (150 mg) by mouth 2 times daily    Encounter for immunization  -     ADMIN VACCINE, INITIAL  -     TDAP VACCINE (BOOSTRIX)      There are no discontinued medications.  Options for treatment and follow-up care were reviewed with the patient. Catarina Guerra  engaged in the decision making process and verbalized understanding of the options discussed and agreed with the final plan.    Anny Bethea M.D.  PGY-3, Family Medicine

## 2017-11-14 ENCOUNTER — TELEPHONE (OUTPATIENT)
Dept: FAMILY MEDICINE | Facility: CLINIC | Age: 42
End: 2017-11-14

## 2017-11-14 DIAGNOSIS — N63.0 LUMP OR MASS IN BREAST: Primary | ICD-10-CM

## 2017-11-14 NOTE — TELEPHONE ENCOUNTER
[facility] phone call message- general phone call:    Reason for call:[facility] from [facility]  calling want to speak with triage nurse regarding wrong referral for mammograms states needs new referral for diagnosis mamograms would you please call     Return call needed: Yes    OK to leave a message on voice mail? Yes    Primary language: English      needed? No    Call taken on November 14, 2017 at 4:12 PM by Enmanuel Petty

## 2017-11-14 NOTE — TELEPHONE ENCOUNTER
RN returned call, unable to reach, left detailed VM on secure line.    Message routed to PCP to update referral    Mary Ellen Green RN

## 2017-11-15 NOTE — TELEPHONE ENCOUNTER
It is not clear to me what kind of referral she needs.  Does she need an external referral for a mammogram versus an internal referral?    Anny Bethea M.D.  PGY-3, Family Medicine

## 2017-11-15 NOTE — TELEPHONE ENCOUNTER
"RN attempted to reach Meliza at [facility] to clarify internal or external referral and what \"wrong diagnosis.\" Unable to reach. Left VM with name and call back number.    Keira Jung RN    "

## 2017-11-15 NOTE — TELEPHONE ENCOUNTER
RN called del again who stated pt now needs an external referral for mammogram as pt transferred to [facility]. Del unsure where referral will need to be sent to but will call back when she knows.     Del states the mammogram needs to be a diagnostic mammogram because it was already known she has a mass and a diagnostic mammogram will be more in depth    Mary Ellen Green RN

## 2017-11-19 NOTE — TELEPHONE ENCOUNTER
"I have put in an order for an \"other external speciality referral\" and asked for a diagnostic mammogram.  There was not an option for an \"external diagnostic mammogram\" referral that I could find. Patient will need to find a facility and use that referral to get the mammogram.      Anny Bethea M.D.  PGY-3, Family Medicine    "

## 2017-11-20 NOTE — PATIENT INSTRUCTIONS
[facility] referral    Other Specialty Referral - EXTERNAL   [#[id number]]          Priority: Routine  Class: External referral         Comment:Patient will  to schedule their own appointment                                    Patient needs a diagnostic mammogram for a                   1x2 cm elongated lump, mobile, at 1:00, 2 cm from areola                   (consistent with firm breast tissue)       Associated Diagnoses         N63.0 Lump or mass in breast

## 2017-11-26 PROBLEM — M84.48XP: Status: ACTIVE | Noted: 2017-11-26

## 2020-03-02 ENCOUNTER — HEALTH MAINTENANCE LETTER (OUTPATIENT)
Age: 45
End: 2020-03-02

## 2020-12-20 ENCOUNTER — HEALTH MAINTENANCE LETTER (OUTPATIENT)
Age: 45
End: 2020-12-20

## 2021-02-28 ENCOUNTER — HEALTH MAINTENANCE LETTER (OUTPATIENT)
Age: 46
End: 2021-02-28

## 2021-04-18 ENCOUNTER — HEALTH MAINTENANCE LETTER (OUTPATIENT)
Age: 46
End: 2021-04-18

## 2021-10-03 ENCOUNTER — HEALTH MAINTENANCE LETTER (OUTPATIENT)
Age: 46
End: 2021-10-03

## 2022-03-20 ENCOUNTER — HEALTH MAINTENANCE LETTER (OUTPATIENT)
Age: 47
End: 2022-03-20

## 2022-05-15 ENCOUNTER — HEALTH MAINTENANCE LETTER (OUTPATIENT)
Age: 47
End: 2022-05-15

## 2022-09-10 ENCOUNTER — HEALTH MAINTENANCE LETTER (OUTPATIENT)
Age: 47
End: 2022-09-10

## 2022-11-27 NOTE — MR AVS SNAPSHOT
After Visit Summary   11/13/2017    Catarina Guerra    MRN: [id number]           Patient Information     Date Of Birth          1975        Visit Information        Provider Department      11/13/2017 1:20 PM Anny Mayen MD [facility]        Today's Diagnoses     Lump or mass in breast    -  1    Urinary urgency        Gastroesophageal reflux disease without esophagitis          Care Instructions    Here is the plan from today's visit    1. Lump or mass in breast  - Screening Mammogram Digital Bilateral; Future    2. Urinary urgency  - UROLOGY ADULT REFERRAL - INTERNAL    3. Gastroesophageal reflux disease without esophagitis  - ranitidine (ZANTAC) 150 MG tablet; Take 1 tablet (150 mg) by mouth 2 times daily  Dispense: 60 tablet; Refill: 3    Please call or return to clinic if your symptoms don't go away.    Follow up plan  Please make a clinic appointment for follow up with me (ANNY MAYEN) in 1-2  months for follow up.    Thank you for coming to [facility] today.  Lab Testing:  **If you had lab testing today and your results are reassuring or normal they will be mailed to you or sent through [employer] within 7 days.   **If the lab tests need quick action we will call you with the results.  The phone number we will call with results is # [phone] (home) . If this is not the best number please call our clinic and change the number.  Medication Refills:  If you need any refills please call your pharmacy and they will contact us.   If you need to  your refill at a new pharmacy, please contact the new pharmacy directly. The new pharmacy will help you get your medications transferred faster.   Scheduling:  If you have any concerns about today's visit or wish to schedule another appointment please call our office during normal business hours [phone] (8-5:00 M-F)  If a referral was made to a [facility] and you don't get a  call from central scheduling please call [phone].  If a Mammogram was ordered for you at [facility] call [phone] to schedule or change your appointment.  If you had an XRay/CT/Ultrasound/MRI ordered the number is [phone] to schedule or change your radiology appointment.   Medical Concerns:  If you have urgent medical concerns please call [phone] at any time of the day.            Follow-ups after your visit        Additional Services     UROLOGY ADULT REFERRAL - INTERNAL       Your provider has referred you to: [place]: [facility] [place] [phone]   [email or web address]    Patient is a 42 year old female with mixed stress and urge incontinence, would most likely benefit from urogynecologist    Please be aware that coverage of these services is subject to the terms and limitations of your health insurance plan.  Call member services at your health plan with any benefit or coverage questions.      Please bring the following with you to your appointment:    (1) Any X-Rays, CTs or MRIs which have been performed.  Contact the facility where they were done to arrange for  prior to your scheduled appointment.    (2) List of current medications  (3) This referral request   (4) Any documents/labs given to you for this referral                  Future tests that were ordered for you today     Open Future Orders        Priority Expected Expires Ordered    Screening Mammogram Digital Bilateral Routine  11/13/2018 11/13/2017            Who to contact     Please call your clinic at [phone] to:    Ask questions about your health    Make or cancel appointments    Discuss your medicines    Learn about your test results    Speak to your doctor   If you have compliments or concerns about an experience at your clinic, or if you wish to file a complaint, please contact [facility] Patient Relations at [phone] or email us at  [email or web address]         Additional Information About Your Visit        [employer] Information     ticcklet gives you secure access to your electronic health record. If you see a primary care provider, you can also send messages to your care team and make appointments. If you have questions, please call your primary care clinic.  If you do not have a primary care provider, please call [phone] and they will assist you.      [employer] is an electronic gateway that provides easy, online access to your medical records. With [employer], you can request a clinic appointment, read your test results, renew a prescription or communicate with your care team.     To access your existing account, please contact your [facility] or call [phone] for assistance.        Care EveryWhere ID     This is your Care EveryWhere ID. This could be used by other organizations to access your [facility] medical records  [id number]        Your Vitals Were     Pulse Temperature Respirations Pulse Oximetry Breastfeeding? BMI (Body Mass Index)    98 98  F (36.7  C) (Oral) 18 96% No 29.72 kg/m2       Blood Pressure from Last 3 Encounters:   11/13/17 128/84   10/24/17 110/75   10/16/17 124/79    Weight from Last 3 Encounters:   11/13/17 150 lb 6.4 oz (68.2 kg)   10/24/17 148 lb (67.1 kg)   10/16/17 143 lb (64.9 kg)              We Performed the Following     UROLOGY ADULT REFERRAL - INTERNAL          Today's Medication Changes          These changes are accurate as of: 11/13/17  1:47 PM.  If you have any questions, ask your nurse or doctor.               These medicines have changed or have updated prescriptions.        Dose/Directions    ranitidine 150 MG tablet   Commonly known as:  ZANTAC   This may have changed:    - medication strength  - when to take this   Used for:  Gastroesophageal reflux disease without esophagitis   Changed by:  Anny Bethea MD        Dose:  150 mg   Take 1 tablet  (150 mg) by mouth 2 times daily   Quantity:  60 tablet   Refills:  3            Where to get your medicines      These medications [place] [facility] [place] [address]  [address], [place] [address]     Phone:  [phone]     ranitidine 150 MG tablet                Primary Care Provider Office Phone # Fax #    Anny Lay Bethea [phone]       [address] [facility] [address]        Equal Access to Services     TAMAR PYLE : Hadii aad ku hadasho Soomaali, waaxda luqadaha, qaybta kaalmada adeegyada, waxay idiin hayaan swapna escobedo . So [facility] [phone].    ATENCIÓN: Si habla español, tiene a bhandari disposición servicios gratuitos de asistencia lingüística. [facility] [phone].    We comply with applicable federal civil rights laws and [place] laws. We do not discriminate on the basis of race, color, national origin, age, disability, sex, sexual orientation, or gender identity.            Thank you!     Thank you for choosing [facility]  for your care. Our goal is always to provide you with excellent care. Hearing back from our patients is one way we can continue to improve our services. Please take a few minutes to complete the written survey that you may receive in the mail after your visit with us. Thank you!             Your Updated Medication List - Protect others around you: Learn how to safely use, store and throw away your medicines at www.disposemymeds.org.          This list is accurate as of: 11/13/17  1:47 PM.  Always use your most recent med list.                   Brand Name Dispense Instructions for use Diagnosis    acetaminophen 325 MG tablet    TYLENOL    100 tablet    Take 2 tablets (650 mg) by mouth every 6 hours as needed for mild pain    Routine health maintenance       DULoxetine 60 MG EC capsule    CYMBALTA     Take by mouth daily        naproxen 500 MG tablet    NAPROSYN     60 tablet    Take 1 tablet (500 mg) by mouth 2 times daily as needed for moderate pain    Arthralgia, unspecified joint       polyethylene glycol powder    MIRALAX    510 g    Take 17 g (1 capful) by mouth daily    Other constipation       ranitidine 150 MG tablet    ZANTAC    60 tablet    Take 1 tablet (150 mg) by mouth 2 times daily    Gastroesophageal reflux disease without esophagitis          no loss of consciousness, no gait abnormality, no headache, no sensory deficits, and no weakness.

## 2023-04-30 ENCOUNTER — HEALTH MAINTENANCE LETTER (OUTPATIENT)
Age: 48
End: 2023-04-30

## 2023-06-03 ENCOUNTER — HEALTH MAINTENANCE LETTER (OUTPATIENT)
Age: 48
End: 2023-06-03